# Patient Record
Sex: FEMALE
[De-identification: names, ages, dates, MRNs, and addresses within clinical notes are randomized per-mention and may not be internally consistent; named-entity substitution may affect disease eponyms.]

---

## 2019-10-09 PROBLEM — Z00.00 ENCOUNTER FOR PREVENTIVE HEALTH EXAMINATION: Status: ACTIVE | Noted: 2019-10-09

## 2020-02-08 ENCOUNTER — TRANSCRIPTION ENCOUNTER (OUTPATIENT)
Age: 60
End: 2020-02-08

## 2020-08-13 ENCOUNTER — APPOINTMENT (OUTPATIENT)
Dept: ORTHOPEDIC SURGERY | Facility: CLINIC | Age: 60
End: 2020-08-13

## 2020-08-17 ENCOUNTER — APPOINTMENT (OUTPATIENT)
Dept: ORTHOPEDIC SURGERY | Facility: CLINIC | Age: 60
End: 2020-08-17
Payer: COMMERCIAL

## 2020-08-17 DIAGNOSIS — Z78.9 OTHER SPECIFIED HEALTH STATUS: ICD-10-CM

## 2020-08-17 DIAGNOSIS — M75.21 BICIPITAL TENDINITIS, RIGHT SHOULDER: ICD-10-CM

## 2020-08-17 DIAGNOSIS — M75.81 OTHER SHOULDER LESIONS, RIGHT SHOULDER: ICD-10-CM

## 2020-08-17 PROCEDURE — 99204 OFFICE O/P NEW MOD 45 MIN: CPT

## 2020-08-17 PROCEDURE — 73030 X-RAY EXAM OF SHOULDER: CPT | Mod: RT

## 2020-08-17 RX ORDER — MELOXICAM 7.5 MG/1
7.5 TABLET ORAL DAILY
Qty: 21 | Refills: 1 | Status: COMPLETED | COMMUNITY
Start: 2020-08-17 | End: 2020-09-28

## 2020-08-17 NOTE — DISCUSSION/SUMMARY
[de-identified] : The patient presents with acute on chronic right  shoulder pain for several months to a year. The patient has tried conservative measures of treatment including rest. The patient has positive  testing on clinical evaluation that  consistent with biceps tendinitis with mild rotator cuff tendinitis. I discussed the role of the rotator cuff in shoulder function including dynamic stability and range of motion, as well as pathology that causes shoulder pain including the proximal biceps tendon, subacromial impingement, bursitis and rotator cuff dysfunction/tendinopathy. On clinical evaluation, I believe that the patient's primary concern is the biceps tendinitis.\par \par Nonoperative modalities of treatment include physical therapy for range of motion therapy, rotator cuff strengthening/scapular stabilization, NSAID's (if able), and subacromial corticosteroid injection. Surgical intervention would include arthroscopic rotator cuff repair for tears that are not amenable to surgical nonoperative treatment or fail a trial of nonoperative management.\par \par Considering the patient's presentation, I've recommended a trial of nonoperative treatment that includes oral anti-inflammatories and physical therapy.  A prescription for physical therapy is given to her as well as meloxicam once daily for 21 days. I will see the patient back in 6 weeks to determine if there is any further improvement. At that time if there is no improvement, will get an MRI. We'll see the patient back at that time. If they have any questions or concerns, I can be available at any time for further discussion.\par

## 2020-08-17 NOTE — PHYSICAL EXAM
----- Message from Benjamin Johnson CNP sent at 2/16/2018  8:36 AM EST -----  TSH deficient- possible hyperthyroidism.   Please let patient know  Please refer to endocrinology and MFM consult  MSAFP pending [de-identified] : Physical Exam:\par General: Well appearing, no acute distress, A&O\par Neurologic: A&Ox3, No focal deficits\par Head: NCAT without abrasions, lacerations, or ecchymosis to head, face, or scalp\par Eyes: No scleral icterus, no gross abnormalities\par Respiratory: Equal chest wall expansion bilaterally, no accessory muscle use\par Lymphatic: No lymphadenopathy palpated\par Skin: Warm and dry\par Psychiatric: Normal mood and affect\par \par Right Shoulder\par ·	Inspection/Palpation: no tenderness, swelling or deformities\par ·	Range of Motion: no crepitus with ROM; Active ; ER at side 25; IR to back pocket; Passive ; ER at side 35; IR to back pocket\par ·	Strength: forward elevation in scapular plane [4/5], internal rotation [4/5], external rotation [4/5], adduction [4/5] and abduction [4/5]\par ·	Stability: no joint instability on provocative testing\par ·	Tests: Araujo test positive, Neer positive, positive drop arm test secondary to pain, bear hug test positive, Napolean sign negative, cross arm adduction negative, lift off sign positive, hornblowers sign negative, speeds test positive Yergason's test positive positive bicipital groove tenderness, Bernal's Active Compression test positive Whipple test negative, bicep's load II test positive\par \par Left Shoulder\par ·	Inspection/Palpation: no tenderness, swelling or deformities\par ·	Range of Motion: full and painless in all planes, no crepitus\par ·	Strength: forward elevation in scapular plane 5/5, internal rotation 5/5, external rotation 5/5, adduction 5/5 and abduction 5/5\par ·	Stability: no joint instability on provocative testing\par Tests: Araujo test negative, Neer sign negative, negative drop arm test secondary to pain, bear hug test negative, Napolean sign negative, cross arm adduction negative, lift off sign positive, hornblowers sign negative, speeds test negative, Yergason's test negative, no bicipital groove tenderness, Bernal's Active Compression test negative [de-identified] :  4 views of the right shoulder were performed today and available for me to review. They demonstrate no fracture or dislocation. [No] glenohumeral degenerative changes noted. [No] AC joint degenerative changes noted. No gross deformities noted.

## 2020-08-17 NOTE — HISTORY OF PRESENT ILLNESS
[Stable] : stable [Intermit.] : ~He/She~ states the symptoms seem to be intermittent [Rest] : relieved by rest [de-identified] : 08/17/2020\par Ms. ALBERTINA LANG is a pleasant 60 year old female, LHD.\par She presents to the office for evaluation of right shoulder pain.\par She has had these symptoms for several months, on and off.\par She denies prior injury.\par Currently her pain is sharp, without radiation.\par She admits to paresthesia down her arm that stops at her elbow.\par She endorses night symptoms that wake her up occasionally.\par Symptoms improve with rest and Advil.\par Symptoms worsen with activity.\par She is not diabetic.\par She denies history of thyroid disease.\par She denies current tobacco use.\par She denies recreational drug use.\par She does not take any narcotic pain medication.\par \par \par  [2] : an average pain level of 2/10 [1] : a minimum pain level of 1/10 [4] : a maximum pain level of 4/10 [de-identified] : activity

## 2020-10-05 ENCOUNTER — APPOINTMENT (OUTPATIENT)
Dept: ORTHOPEDIC SURGERY | Facility: CLINIC | Age: 60
End: 2020-10-05

## 2021-09-25 ENCOUNTER — TRANSCRIPTION ENCOUNTER (OUTPATIENT)
Age: 61
End: 2021-09-25

## 2022-03-01 NOTE — REVIEW OF SYSTEMS
The [facility] Form was faxed back    Per Dr. Grant's office, the recommendation for the BI-ICD device during the colonoscopy & EGD procedure scheduled on 3/3/22 at 1030 is to:  **Apply a magnet if cautery is used**    Device Name: [employer]  Date of last interrogation: 1/7/22  Indication for the device: Cardiomyopathy  Pacing Rate: 60      **see original form scanned into the patient's chart**   [Joint Pain] : joint pain [Joint Swelling] : joint swelling [Hot Flashes] : hot flashes [Negative] : Heme/Lymph [[id number]] : right shoulder

## 2024-02-24 ENCOUNTER — NON-APPOINTMENT (OUTPATIENT)
Age: 64
End: 2024-02-24